# Patient Record
Sex: FEMALE | Race: WHITE | HISPANIC OR LATINO | URBAN - METROPOLITAN AREA
[De-identification: names, ages, dates, MRNs, and addresses within clinical notes are randomized per-mention and may not be internally consistent; named-entity substitution may affect disease eponyms.]

---

## 2017-02-15 PROBLEM — Z00.00 ENCOUNTER FOR PREVENTIVE HEALTH EXAMINATION: Status: ACTIVE | Noted: 2017-02-15

## 2017-03-02 ENCOUNTER — OUTPATIENT (OUTPATIENT)
Dept: OUTPATIENT SERVICES | Facility: HOSPITAL | Age: 52
LOS: 1 days | End: 2017-03-02
Payer: COMMERCIAL

## 2017-03-02 ENCOUNTER — APPOINTMENT (OUTPATIENT)
Dept: MAMMOGRAPHY | Facility: CLINIC | Age: 52
End: 2017-03-02

## 2017-03-02 DIAGNOSIS — Z00.8 ENCOUNTER FOR OTHER GENERAL EXAMINATION: ICD-10-CM

## 2017-04-13 PROCEDURE — 77063 BREAST TOMOSYNTHESIS BI: CPT

## 2017-04-13 PROCEDURE — 77067 SCR MAMMO BI INCL CAD: CPT

## 2018-02-12 ENCOUNTER — RESULT REVIEW (OUTPATIENT)
Age: 53
End: 2018-02-12

## 2018-03-05 ENCOUNTER — APPOINTMENT (OUTPATIENT)
Dept: MAMMOGRAPHY | Facility: CLINIC | Age: 53
End: 2018-03-05
Payer: COMMERCIAL

## 2018-03-05 ENCOUNTER — OUTPATIENT (OUTPATIENT)
Dept: OUTPATIENT SERVICES | Facility: HOSPITAL | Age: 53
LOS: 1 days | End: 2018-03-05
Payer: COMMERCIAL

## 2018-03-05 DIAGNOSIS — Z00.8 ENCOUNTER FOR OTHER GENERAL EXAMINATION: ICD-10-CM

## 2018-03-05 PROCEDURE — 77063 BREAST TOMOSYNTHESIS BI: CPT | Mod: 26

## 2018-03-05 PROCEDURE — 77063 BREAST TOMOSYNTHESIS BI: CPT

## 2018-03-05 PROCEDURE — 77067 SCR MAMMO BI INCL CAD: CPT

## 2018-03-05 PROCEDURE — 77067 SCR MAMMO BI INCL CAD: CPT | Mod: 26

## 2019-02-28 ENCOUNTER — RESULT REVIEW (OUTPATIENT)
Age: 54
End: 2019-02-28

## 2019-03-08 ENCOUNTER — TRANSCRIPTION ENCOUNTER (OUTPATIENT)
Age: 54
End: 2019-03-08

## 2019-03-08 ENCOUNTER — OUTPATIENT (OUTPATIENT)
Dept: OUTPATIENT SERVICES | Facility: HOSPITAL | Age: 54
LOS: 1 days | End: 2019-03-08
Payer: COMMERCIAL

## 2019-03-08 ENCOUNTER — APPOINTMENT (OUTPATIENT)
Dept: MAMMOGRAPHY | Facility: CLINIC | Age: 54
End: 2019-03-08
Payer: COMMERCIAL

## 2019-03-08 DIAGNOSIS — Z12.81 ENCOUNTER FOR SCREENING FOR MALIGNANT NEOPLASM OF ORAL CAVITY: ICD-10-CM

## 2019-03-08 DIAGNOSIS — Z00.8 ENCOUNTER FOR OTHER GENERAL EXAMINATION: ICD-10-CM

## 2019-03-08 PROCEDURE — 77067 SCR MAMMO BI INCL CAD: CPT

## 2019-03-08 PROCEDURE — 77063 BREAST TOMOSYNTHESIS BI: CPT | Mod: 26

## 2019-03-08 PROCEDURE — 77063 BREAST TOMOSYNTHESIS BI: CPT

## 2019-03-08 PROCEDURE — 77067 SCR MAMMO BI INCL CAD: CPT | Mod: 26

## 2022-05-09 ENCOUNTER — OUTPATIENT (OUTPATIENT)
Dept: OUTPATIENT SERVICES | Facility: HOSPITAL | Age: 57
LOS: 1 days | End: 2022-05-09
Payer: COMMERCIAL

## 2022-05-09 ENCOUNTER — APPOINTMENT (OUTPATIENT)
Dept: MAMMOGRAPHY | Facility: CLINIC | Age: 57
End: 2022-05-09
Payer: COMMERCIAL

## 2022-05-09 DIAGNOSIS — Z00.00 ENCOUNTER FOR GENERAL ADULT MEDICAL EXAMINATION WITHOUT ABNORMAL FINDINGS: ICD-10-CM

## 2022-05-09 PROCEDURE — 77063 BREAST TOMOSYNTHESIS BI: CPT

## 2022-05-09 PROCEDURE — 77067 SCR MAMMO BI INCL CAD: CPT

## 2022-05-09 PROCEDURE — 77063 BREAST TOMOSYNTHESIS BI: CPT | Mod: 26

## 2022-05-09 PROCEDURE — 77067 SCR MAMMO BI INCL CAD: CPT | Mod: 26

## 2024-06-15 ENCOUNTER — APPOINTMENT (OUTPATIENT)
Dept: MRI IMAGING | Facility: CLINIC | Age: 59
End: 2024-06-15
Payer: COMMERCIAL

## 2024-06-15 ENCOUNTER — APPOINTMENT (OUTPATIENT)
Dept: MRI IMAGING | Facility: CLINIC | Age: 59
End: 2024-06-15

## 2024-06-15 ENCOUNTER — APPOINTMENT (OUTPATIENT)
Dept: MAMMOGRAPHY | Facility: CLINIC | Age: 59
End: 2024-06-15

## 2024-06-15 PROCEDURE — 77063 BREAST TOMOSYNTHESIS BI: CPT

## 2024-06-15 PROCEDURE — 72148 MRI LUMBAR SPINE W/O DYE: CPT

## 2024-06-15 PROCEDURE — 77067 SCR MAMMO BI INCL CAD: CPT

## 2024-08-15 ENCOUNTER — APPOINTMENT (OUTPATIENT)
Dept: PAIN MANAGEMENT | Facility: CLINIC | Age: 59
End: 2024-08-15
Payer: COMMERCIAL

## 2024-08-15 VITALS — WEIGHT: 149 LBS | BODY MASS INDEX: 26.4 KG/M2 | HEIGHT: 63 IN

## 2024-08-15 DIAGNOSIS — M54.16 RADICULOPATHY, LUMBAR REGION: ICD-10-CM

## 2024-08-15 DIAGNOSIS — Z86.79 PERSONAL HISTORY OF OTHER DISEASES OF THE CIRCULATORY SYSTEM: ICD-10-CM

## 2024-08-15 DIAGNOSIS — Z86.69 PERSONAL HISTORY OF OTHER DISEASES OF THE NERVOUS SYSTEM AND SENSE ORGANS: ICD-10-CM

## 2024-08-15 DIAGNOSIS — Z86.39 PERSONAL HISTORY OF OTHER ENDOCRINE, NUTRITIONAL AND METABOLIC DISEASE: ICD-10-CM

## 2024-08-15 PROCEDURE — 99204 OFFICE O/P NEW MOD 45 MIN: CPT

## 2024-08-15 NOTE — PHYSICAL EXAM
[] : no lumbar paraspinal tenderness [Flexion] : flexion [4___] : right hip flexion 4[unfilled]/5 [TWNoteComboBox7] : forward flexion 60 degrees [de-identified] : extension 10 degrees

## 2024-08-15 NOTE — HISTORY OF PRESENT ILLNESS
[Lower back] : lower back [8] : 8 [7] : 7 [Dull/Aching] : dull/aching [Radiating] : radiating [Sharp] : sharp [Constant] : constant [Household chores] : household chores [Leisure] : leisure [Work] : work [Sleep] : sleep [Meds] : meds [FreeTextEntry1] : 08/15/2024 : Patient presents for initial evaluation. She c/o of having pain on her lower back and the pain radiates down to the knees. Pain started the second week of May. She went to Urgent care was given medrol. This did help. She went to 2 weeks of physical therapy and it did not help. Pain in the back, buttocks, groin and proximal thighs. She feels like her legs are swollen. Lower back has gotten better.   h/o open angle glaucoma and can not take steroids   Subjective Weakness: No Numbness/Tingling: Yes Bladder/Bowel dysfunction: No Treatments Tried: steroids, physical therapy Blood Thinners: No   Attempted modalities for current pain complaint: See above: Medications: No   Injections: No   Previous Spine Surgery: No   Imaging: MRI Lumbar Spine :  6/15/24 [facility]: IMPRESSION:  Lumbar spondylosis resulting in mild left foraminal narrowing at L3/L4 and L4/L5. Disc appears to abut the exiting left L4 nerve. Retrolisthesis with central protrusion at L5/S1 minimal foraminal narrowing. No spinal canal narrowing.   [] : no [FreeTextEntry6] : nerve pain  [FreeTextEntry7] : b/l thighs  [de-identified] : changing positions [de-identified] : L MRI

## 2024-08-15 NOTE — ASSESSMENT
[FreeTextEntry1] : After discussing various treatment options with the patient including but not limited to oral medications, physical therapy, exercise, modalities as well as interventional spinal injections, we have decided with the following plan:  1) ESR< CRP to r/o PMR   2) pending lab work, would consider LESI L4/5 The risks, benefits, contents and alternatives to injection were explained in full to the patient.  Risks outlined include but are not limited to infection, sepsis, bleeding, post-dural puncture headache, nerve damage, temporary increase in pain, syncopal episode, failure to resolve symptoms, allergic reaction, symptom recurrence, and elevation of blood sugar in diabetics. Cortisone may cause immunosuppression.  Patient understands the risks.  All questions were answered.  After discussion of options, patient requested an injection.  Information regarding the injection was given to the patient.  Which medications to stop prior to the injection was explained to the patient as well.  Patient is presenting with acute/sub-acute radicular pain with impairment in ADLs and functionality.  The pain has not responded sufficiently to  conservative care including nsaid therapy and/or physical therapy.  There is no bleeding tendency, unstable medical condition, or systemic infection. The purpose of the spinal injections is to facilitate active therapy by providing short term relief through reduction of pain and inflammation.   Injections, by themselves, are not likely to provide long-term relief. Rather, active rehabilitation with modified work achieves long-term relief by increasing active ROM, strength and stability.

## 2024-08-15 NOTE — HISTORY OF PRESENT ILLNESS
[Lower back] : lower back [8] : 8 [7] : 7 [Dull/Aching] : dull/aching [Radiating] : radiating [Sharp] : sharp [Constant] : constant [Household chores] : household chores [Leisure] : leisure [Work] : work [Sleep] : sleep [Meds] : meds [FreeTextEntry1] : 08/15/2024 : Patient presents for initial evaluation. She c/o of having pain on her lower back and the pain radiates down to the knees. Pain started the second week of May. She went to Urgent care was given medrol. This did help. She went to 2 weeks of physical therapy and it did not help. Pain in the back, buttocks, groin and proximal thighs. She feels like her legs are swollen. Lower back has gotten better.   h/o open angle glaucoma and can not take steroids   Subjective Weakness: No Numbness/Tingling: Yes Bladder/Bowel dysfunction: No Treatments Tried: steroids, physical therapy Blood Thinners: No   Attempted modalities for current pain complaint: See above: Medications: No   Injections: No   Previous Spine Surgery: No   Imaging: MRI Lumbar Spine :  6/15/24 [facility]: IMPRESSION:  Lumbar spondylosis resulting in mild left foraminal narrowing at L3/L4 and L4/L5. Disc appears to abut the exiting left L4 nerve. Retrolisthesis with central protrusion at L5/S1 minimal foraminal narrowing. No spinal canal narrowing.   [] : no [FreeTextEntry6] : nerve pain  [FreeTextEntry7] : b/l thighs  [de-identified] : changing positions [de-identified] : L MRI

## 2024-08-15 NOTE — PHYSICAL EXAM
[] : no lumbar paraspinal tenderness [Flexion] : flexion [4___] : right hip flexion 4[unfilled]/5 [TWNoteComboBox7] : forward flexion 60 degrees [de-identified] : extension 10 degrees

## 2024-08-19 ENCOUNTER — LABORATORY RESULT (OUTPATIENT)
Age: 59
End: 2024-08-19

## 2024-10-17 ENCOUNTER — APPOINTMENT (OUTPATIENT)
Dept: PAIN MANAGEMENT | Facility: CLINIC | Age: 59
End: 2024-10-17
Payer: COMMERCIAL

## 2024-10-17 DIAGNOSIS — M54.16 RADICULOPATHY, LUMBAR REGION: ICD-10-CM

## 2024-10-17 PROCEDURE — 62323 NJX INTERLAMINAR LMBR/SAC: CPT

## 2024-11-07 ENCOUNTER — APPOINTMENT (OUTPATIENT)
Dept: PAIN MANAGEMENT | Facility: CLINIC | Age: 59
End: 2024-11-07
Payer: COMMERCIAL

## 2024-11-07 VITALS — HEIGHT: 63 IN | WEIGHT: 145 LBS | BODY MASS INDEX: 25.69 KG/M2

## 2024-11-07 DIAGNOSIS — M54.16 RADICULOPATHY, LUMBAR REGION: ICD-10-CM

## 2024-11-07 PROCEDURE — 99213 OFFICE O/P EST LOW 20 MIN: CPT

## 2025-02-21 ENCOUNTER — APPOINTMENT (OUTPATIENT)
Age: 60
End: 2025-02-21
Payer: COMMERCIAL

## 2025-02-21 PROCEDURE — 62323 NJX INTERLAMINAR LMBR/SAC: CPT

## 2025-03-06 ENCOUNTER — APPOINTMENT (OUTPATIENT)
Dept: PAIN MANAGEMENT | Facility: CLINIC | Age: 60
End: 2025-03-06
Payer: COMMERCIAL

## 2025-03-06 VITALS — WEIGHT: 147 LBS | BODY MASS INDEX: 26.05 KG/M2 | HEIGHT: 63 IN

## 2025-03-06 DIAGNOSIS — M54.16 RADICULOPATHY, LUMBAR REGION: ICD-10-CM

## 2025-03-06 PROCEDURE — 99214 OFFICE O/P EST MOD 30 MIN: CPT

## 2025-08-18 ENCOUNTER — NON-APPOINTMENT (OUTPATIENT)
Age: 60
End: 2025-08-18

## 2025-08-21 ENCOUNTER — APPOINTMENT (OUTPATIENT)
Dept: PAIN MANAGEMENT | Facility: CLINIC | Age: 60
End: 2025-08-21
Payer: COMMERCIAL

## 2025-08-21 VITALS — WEIGHT: 147 LBS | BODY MASS INDEX: 26.05 KG/M2 | HEIGHT: 63 IN

## 2025-08-21 DIAGNOSIS — M54.16 RADICULOPATHY, LUMBAR REGION: ICD-10-CM

## 2025-08-21 PROCEDURE — 99214 OFFICE O/P EST MOD 30 MIN: CPT

## 2025-08-26 ENCOUNTER — OUTPATIENT (OUTPATIENT)
Dept: OUTPATIENT SERVICES | Facility: HOSPITAL | Age: 60
LOS: 1 days | End: 2025-08-26
Payer: COMMERCIAL

## 2025-08-26 ENCOUNTER — APPOINTMENT (OUTPATIENT)
Dept: WOUND CARE | Facility: HOSPITAL | Age: 60
End: 2025-08-26
Payer: COMMERCIAL

## 2025-08-26 VITALS
SYSTOLIC BLOOD PRESSURE: 174 MMHG | DIASTOLIC BLOOD PRESSURE: 65 MMHG | WEIGHT: 144 LBS | HEART RATE: 55 BPM | TEMPERATURE: 98.5 F | RESPIRATION RATE: 18 BRPM | HEIGHT: 63 IN | OXYGEN SATURATION: 98 % | BODY MASS INDEX: 25.52 KG/M2

## 2025-08-26 DIAGNOSIS — Z78.9 OTHER SPECIFIED HEALTH STATUS: ICD-10-CM

## 2025-08-26 DIAGNOSIS — E78.5 HYPERLIPIDEMIA, UNSPECIFIED: ICD-10-CM

## 2025-08-26 DIAGNOSIS — Z91.048 OTHER NONMEDICINAL SUBSTANCE ALLERGY STATUS: ICD-10-CM

## 2025-08-26 DIAGNOSIS — I10 ESSENTIAL (PRIMARY) HYPERTENSION: ICD-10-CM

## 2025-08-26 DIAGNOSIS — J45.909 UNSPECIFIED ASTHMA, UNCOMPLICATED: ICD-10-CM

## 2025-08-26 DIAGNOSIS — H40.9 UNSPECIFIED GLAUCOMA: ICD-10-CM

## 2025-08-26 DIAGNOSIS — Z87.891 PERSONAL HISTORY OF NICOTINE DEPENDENCE: ICD-10-CM

## 2025-08-26 DIAGNOSIS — H91.22 SUDDEN IDIOPATHIC HEARING LOSS, LEFT EAR: ICD-10-CM

## 2025-08-26 DIAGNOSIS — H91.90 UNSPECIFIED HEARING LOSS, UNSPECIFIED EAR: ICD-10-CM

## 2025-08-26 DIAGNOSIS — Z98.890 OTHER SPECIFIED POSTPROCEDURAL STATES: ICD-10-CM

## 2025-08-26 DIAGNOSIS — E11.9 TYPE 2 DIABETES MELLITUS WITHOUT COMPLICATIONS: ICD-10-CM

## 2025-08-26 DIAGNOSIS — E78.00 PURE HYPERCHOLESTEROLEMIA, UNSPECIFIED: ICD-10-CM

## 2025-08-26 LAB
ALBUMIN SERPL ELPH-MCNC: 4.3 G/DL — SIGNIFICANT CHANGE UP (ref 3.3–5)
ALP SERPL-CCNC: 86 U/L — SIGNIFICANT CHANGE UP (ref 40–120)
ALT FLD-CCNC: 23 U/L — SIGNIFICANT CHANGE UP (ref 12–78)
ANION GAP SERPL CALC-SCNC: 5 MMOL/L — SIGNIFICANT CHANGE UP (ref 5–17)
AST SERPL-CCNC: 9 U/L — LOW (ref 15–37)
BASOPHILS # BLD AUTO: 0.06 K/UL — SIGNIFICANT CHANGE UP (ref 0–0.2)
BASOPHILS NFR BLD AUTO: 0.5 % — SIGNIFICANT CHANGE UP (ref 0–2)
BILIRUB SERPL-MCNC: 0.3 MG/DL — SIGNIFICANT CHANGE UP (ref 0.2–1.2)
BUN SERPL-MCNC: 15 MG/DL — SIGNIFICANT CHANGE UP (ref 7–23)
CALCIUM SERPL-MCNC: 10.1 MG/DL — SIGNIFICANT CHANGE UP (ref 8.5–10.1)
CHLORIDE SERPL-SCNC: 106 MMOL/L — SIGNIFICANT CHANGE UP (ref 96–108)
CO2 SERPL-SCNC: 29 MMOL/L — SIGNIFICANT CHANGE UP (ref 22–31)
CREAT SERPL-MCNC: 0.57 MG/DL — SIGNIFICANT CHANGE UP (ref 0.5–1.3)
CRP SERPL-MCNC: <3 MG/L — SIGNIFICANT CHANGE UP
EGFR: 104 ML/MIN/1.73M2 — SIGNIFICANT CHANGE UP
EGFR: 104 ML/MIN/1.73M2 — SIGNIFICANT CHANGE UP
EOSINOPHIL # BLD AUTO: 0.28 K/UL — SIGNIFICANT CHANGE UP (ref 0–0.5)
EOSINOPHIL NFR BLD AUTO: 2.6 % — SIGNIFICANT CHANGE UP (ref 0–6)
ERYTHROCYTE [SEDIMENTATION RATE] IN BLOOD: 10 MM/HR — SIGNIFICANT CHANGE UP (ref 0–20)
GLUCOSE SERPL-MCNC: 97 MG/DL — SIGNIFICANT CHANGE UP (ref 70–99)
HCT VFR BLD CALC: 39.2 % — SIGNIFICANT CHANGE UP (ref 34.5–45)
HGB BLD-MCNC: 13.1 G/DL — SIGNIFICANT CHANGE UP (ref 11.5–15.5)
IMM GRANULOCYTES # BLD AUTO: 0.03 K/UL — SIGNIFICANT CHANGE UP (ref 0–0.07)
IMM GRANULOCYTES NFR BLD AUTO: 0.3 % — SIGNIFICANT CHANGE UP (ref 0–0.9)
LYMPHOCYTES # BLD AUTO: 4.07 K/UL — HIGH (ref 1–3.3)
LYMPHOCYTES NFR BLD AUTO: 37.1 % — SIGNIFICANT CHANGE UP (ref 13–44)
MCHC RBC-ENTMCNC: 30 PG — SIGNIFICANT CHANGE UP (ref 27–34)
MCHC RBC-ENTMCNC: 33.4 G/DL — SIGNIFICANT CHANGE UP (ref 32–36)
MCV RBC AUTO: 89.9 FL — SIGNIFICANT CHANGE UP (ref 80–100)
MONOCYTES # BLD AUTO: 0.58 K/UL — SIGNIFICANT CHANGE UP (ref 0–0.9)
MONOCYTES NFR BLD AUTO: 5.3 % — SIGNIFICANT CHANGE UP (ref 2–14)
NEUTROPHILS # BLD AUTO: 5.96 K/UL — SIGNIFICANT CHANGE UP (ref 1.8–7.4)
NEUTROPHILS NFR BLD AUTO: 54.2 % — SIGNIFICANT CHANGE UP (ref 43–77)
NRBC # BLD AUTO: 0 K/UL — SIGNIFICANT CHANGE UP (ref 0–0)
NRBC # FLD: 0 K/UL — SIGNIFICANT CHANGE UP (ref 0–0)
NRBC BLD AUTO-RTO: 0 /100 WBCS — SIGNIFICANT CHANGE UP (ref 0–0)
PLATELET # BLD AUTO: 348 K/UL — SIGNIFICANT CHANGE UP (ref 150–400)
PMV BLD: 9.4 FL — SIGNIFICANT CHANGE UP (ref 7–13)
POTASSIUM SERPL-MCNC: 3.9 MMOL/L — SIGNIFICANT CHANGE UP (ref 3.5–5.3)
POTASSIUM SERPL-SCNC: 3.9 MMOL/L — SIGNIFICANT CHANGE UP (ref 3.5–5.3)
PREALB SERPL-MCNC: 17 MG/DL — LOW (ref 20–40)
PROT SERPL-MCNC: 8.1 G/DL — SIGNIFICANT CHANGE UP (ref 6–8.3)
RBC # BLD: 4.36 M/UL — SIGNIFICANT CHANGE UP (ref 3.8–5.2)
RBC # FLD: 12.1 % — SIGNIFICANT CHANGE UP (ref 10.3–14.5)
SODIUM SERPL-SCNC: 140 MMOL/L — SIGNIFICANT CHANGE UP (ref 135–145)
WBC # BLD: 10.98 K/UL — HIGH (ref 3.8–10.5)
WBC # FLD AUTO: 10.98 K/UL — HIGH (ref 3.8–10.5)

## 2025-08-26 PROCEDURE — 99203 OFFICE O/P NEW LOW 30 MIN: CPT

## 2025-08-26 PROCEDURE — 36415 COLL VENOUS BLD VENIPUNCTURE: CPT

## 2025-08-26 PROCEDURE — 85652 RBC SED RATE AUTOMATED: CPT

## 2025-08-26 PROCEDURE — 71046 X-RAY EXAM CHEST 2 VIEWS: CPT | Mod: 26

## 2025-08-26 PROCEDURE — 85025 COMPLETE CBC W/AUTO DIFF WBC: CPT

## 2025-08-26 PROCEDURE — G0463: CPT

## 2025-08-26 PROCEDURE — 80053 COMPREHEN METABOLIC PANEL: CPT

## 2025-08-26 PROCEDURE — 71046 X-RAY EXAM CHEST 2 VIEWS: CPT

## 2025-08-26 RX ORDER — METFORMIN HYDROCHLORIDE 750 MG/1
750 TABLET ORAL
Refills: 0 | Status: ACTIVE | COMMUNITY

## 2025-08-26 RX ORDER — AMLODIPINE BESYLATE 2.5 MG/1
2.5 TABLET ORAL
Refills: 0 | Status: ACTIVE | COMMUNITY

## 2025-08-26 RX ORDER — LATANOPROST/PF 0.005 %
0.01 DROPS OPHTHALMIC (EYE)
Refills: 0 | Status: ACTIVE | COMMUNITY

## 2025-08-26 RX ORDER — ROSUVASTATIN CALCIUM 40 MG/1
40 TABLET, FILM COATED ORAL
Refills: 0 | Status: ACTIVE | COMMUNITY

## 2025-08-26 RX ORDER — CETIRIZINE HCL 10 MG
TABLET ORAL
Refills: 0 | Status: ACTIVE | COMMUNITY

## 2025-08-26 RX ORDER — ALBUTEROL 90 MCG
AEROSOL (GRAM) INHALATION
Refills: 0 | Status: ACTIVE | COMMUNITY

## 2025-08-27 ENCOUNTER — NON-APPOINTMENT (OUTPATIENT)
Age: 60
End: 2025-08-27

## 2025-08-27 LAB
A1C WITH ESTIMATED AVERAGE GLUCOSE RESULT: 7 % — HIGH (ref 4–5.6)
ESTIMATED AVERAGE GLUCOSE: 154 MG/DL — HIGH (ref 68–114)

## 2025-08-28 ENCOUNTER — APPOINTMENT (OUTPATIENT)
Dept: HYPERBARIC MEDICINE | Facility: HOSPITAL | Age: 60
End: 2025-08-28
Payer: COMMERCIAL

## 2025-08-28 ENCOUNTER — OUTPATIENT (OUTPATIENT)
Dept: OUTPATIENT SERVICES | Facility: HOSPITAL | Age: 60
LOS: 1 days | End: 2025-08-28
Payer: COMMERCIAL

## 2025-08-28 VITALS
OXYGEN SATURATION: 97 % | HEART RATE: 93 BPM | RESPIRATION RATE: 16 BRPM | TEMPERATURE: 99.2 F | DIASTOLIC BLOOD PRESSURE: 80 MMHG | SYSTOLIC BLOOD PRESSURE: 162 MMHG

## 2025-08-28 VITALS
HEART RATE: 84 BPM | OXYGEN SATURATION: 97 % | SYSTOLIC BLOOD PRESSURE: 145 MMHG | DIASTOLIC BLOOD PRESSURE: 82 MMHG | TEMPERATURE: 98.5 F | RESPIRATION RATE: 16 BRPM

## 2025-08-28 DIAGNOSIS — E11.9 TYPE 2 DIABETES MELLITUS WITHOUT COMPLICATIONS: ICD-10-CM

## 2025-08-28 DIAGNOSIS — R68.84 JAW PAIN: ICD-10-CM

## 2025-08-28 DIAGNOSIS — H91.22 SUDDEN IDIOPATHIC HEARING LOSS, LEFT EAR: ICD-10-CM

## 2025-08-28 DIAGNOSIS — E11.9 TYPE 2 DIABETES MELLITUS W/OUT COMPLICATIONS: ICD-10-CM

## 2025-08-28 LAB
GLUCOSE BLDC GLUCOMTR-MCNC: 161 MG/DL — HIGH (ref 70–99)
GLUCOSE BLDC GLUCOMTR-MCNC: 187 MG/DL — HIGH (ref 70–99)

## 2025-08-28 PROCEDURE — G0277: CPT

## 2025-08-28 PROCEDURE — 82962 GLUCOSE BLOOD TEST: CPT

## 2025-08-28 PROCEDURE — 99212 OFFICE O/P EST SF 10 MIN: CPT

## 2025-08-29 ENCOUNTER — APPOINTMENT (OUTPATIENT)
Dept: HYPERBARIC MEDICINE | Facility: HOSPITAL | Age: 60
End: 2025-08-29

## 2025-09-02 ENCOUNTER — NON-APPOINTMENT (OUTPATIENT)
Age: 60
End: 2025-09-02

## 2025-09-02 ENCOUNTER — APPOINTMENT (OUTPATIENT)
Dept: HYPERBARIC MEDICINE | Facility: HOSPITAL | Age: 60
End: 2025-09-02

## 2025-09-03 ENCOUNTER — APPOINTMENT (OUTPATIENT)
Dept: HYPERBARIC MEDICINE | Facility: HOSPITAL | Age: 60
End: 2025-09-03

## 2025-09-04 ENCOUNTER — APPOINTMENT (OUTPATIENT)
Dept: HYPERBARIC MEDICINE | Facility: HOSPITAL | Age: 60
End: 2025-09-04

## 2025-09-05 ENCOUNTER — APPOINTMENT (OUTPATIENT)
Dept: HYPERBARIC MEDICINE | Facility: HOSPITAL | Age: 60
End: 2025-09-05

## 2025-09-05 ENCOUNTER — APPOINTMENT (OUTPATIENT)
Age: 60
End: 2025-09-05

## 2025-09-08 ENCOUNTER — NON-APPOINTMENT (OUTPATIENT)
Age: 60
End: 2025-09-08

## 2025-09-08 ENCOUNTER — APPOINTMENT (OUTPATIENT)
Dept: HYPERBARIC MEDICINE | Facility: HOSPITAL | Age: 60
End: 2025-09-08

## 2025-09-09 ENCOUNTER — APPOINTMENT (OUTPATIENT)
Dept: HYPERBARIC MEDICINE | Facility: HOSPITAL | Age: 60
End: 2025-09-09

## 2025-09-10 ENCOUNTER — APPOINTMENT (OUTPATIENT)
Dept: HYPERBARIC MEDICINE | Facility: HOSPITAL | Age: 60
End: 2025-09-10

## 2025-09-11 ENCOUNTER — NON-APPOINTMENT (OUTPATIENT)
Age: 60
End: 2025-09-11

## 2025-09-11 ENCOUNTER — APPOINTMENT (OUTPATIENT)
Dept: HYPERBARIC MEDICINE | Facility: HOSPITAL | Age: 60
End: 2025-09-11

## 2025-09-12 ENCOUNTER — APPOINTMENT (OUTPATIENT)
Dept: HYPERBARIC MEDICINE | Facility: HOSPITAL | Age: 60
End: 2025-09-12

## 2025-09-12 ENCOUNTER — APPOINTMENT (OUTPATIENT)
Dept: MRI IMAGING | Facility: CLINIC | Age: 60
End: 2025-09-12

## 2025-09-12 ENCOUNTER — APPOINTMENT (OUTPATIENT)
Dept: MAMMOGRAPHY | Facility: CLINIC | Age: 60
End: 2025-09-12

## 2025-09-15 ENCOUNTER — APPOINTMENT (OUTPATIENT)
Dept: HYPERBARIC MEDICINE | Facility: HOSPITAL | Age: 60
End: 2025-09-15

## 2025-09-16 ENCOUNTER — APPOINTMENT (OUTPATIENT)
Dept: HYPERBARIC MEDICINE | Facility: HOSPITAL | Age: 60
End: 2025-09-16

## 2025-09-17 ENCOUNTER — APPOINTMENT (OUTPATIENT)
Dept: HYPERBARIC MEDICINE | Facility: HOSPITAL | Age: 60
End: 2025-09-17

## 2025-09-18 ENCOUNTER — APPOINTMENT (OUTPATIENT)
Dept: HYPERBARIC MEDICINE | Facility: HOSPITAL | Age: 60
End: 2025-09-18

## 2025-09-19 ENCOUNTER — APPOINTMENT (OUTPATIENT)
Dept: HYPERBARIC MEDICINE | Facility: HOSPITAL | Age: 60
End: 2025-09-19